# Patient Record
Sex: FEMALE | Race: WHITE | NOT HISPANIC OR LATINO | Employment: FULL TIME | URBAN - METROPOLITAN AREA
[De-identification: names, ages, dates, MRNs, and addresses within clinical notes are randomized per-mention and may not be internally consistent; named-entity substitution may affect disease eponyms.]

---

## 2023-07-20 ENCOUNTER — LAB (OUTPATIENT)
Dept: LAB | Facility: LAB | Age: 23
End: 2023-07-20
Payer: COMMERCIAL

## 2023-07-21 LAB — TOBACCO SCREEN, URINE: NEGATIVE

## 2023-09-07 ENCOUNTER — APPOINTMENT (OUTPATIENT)
Dept: PRIMARY CARE | Facility: CLINIC | Age: 23
End: 2023-09-07
Payer: COMMERCIAL

## 2023-12-11 ENCOUNTER — OFFICE VISIT (OUTPATIENT)
Dept: PRIMARY CARE | Facility: CLINIC | Age: 23
End: 2023-12-11
Payer: COMMERCIAL

## 2023-12-11 VITALS
OXYGEN SATURATION: 97 % | DIASTOLIC BLOOD PRESSURE: 64 MMHG | BODY MASS INDEX: 27.43 KG/M2 | HEIGHT: 68 IN | HEART RATE: 65 BPM | SYSTOLIC BLOOD PRESSURE: 118 MMHG | WEIGHT: 181 LBS

## 2023-12-11 DIAGNOSIS — Z76.89 ENCOUNTER TO ESTABLISH CARE: Primary | ICD-10-CM

## 2023-12-11 DIAGNOSIS — Z30.41 ENCOUNTER FOR SURVEILLANCE OF CONTRACEPTIVE PILLS: ICD-10-CM

## 2023-12-11 DIAGNOSIS — R10.13 CHRONIC EPIGASTRIC PAIN: Chronic | ICD-10-CM

## 2023-12-11 DIAGNOSIS — D17.20 LIPOMA OF UPPER EXTREMITY, UNSPECIFIED LATERALITY: ICD-10-CM

## 2023-12-11 DIAGNOSIS — R73.9 HYPERGLYCEMIA: ICD-10-CM

## 2023-12-11 DIAGNOSIS — F41.9 ANXIETY: Chronic | ICD-10-CM

## 2023-12-11 DIAGNOSIS — Z12.4 PAP SMEAR FOR CERVICAL CANCER SCREENING: ICD-10-CM

## 2023-12-11 DIAGNOSIS — G89.29 CHRONIC EPIGASTRIC PAIN: Chronic | ICD-10-CM

## 2023-12-11 DIAGNOSIS — Z13.29 THYROID DISORDER SCREENING: ICD-10-CM

## 2023-12-11 DIAGNOSIS — R10.9 STOMACHACHE: ICD-10-CM

## 2023-12-11 LAB
ALBUMIN SERPL BCP-MCNC: 4.4 G/DL (ref 3.4–5)
ALP SERPL-CCNC: 43 U/L (ref 33–110)
ALT SERPL W P-5'-P-CCNC: 24 U/L (ref 7–45)
ANION GAP SERPL CALC-SCNC: 11 MMOL/L (ref 10–20)
AST SERPL W P-5'-P-CCNC: 40 U/L (ref 9–39)
BASOPHILS # BLD AUTO: 0.02 X10*3/UL (ref 0–0.1)
BASOPHILS NFR BLD AUTO: 0.3 %
BILIRUB SERPL-MCNC: 0.3 MG/DL (ref 0–1.2)
BUN SERPL-MCNC: 13 MG/DL (ref 6–23)
CALCIUM SERPL-MCNC: 9.5 MG/DL (ref 8.6–10.6)
CHLORIDE SERPL-SCNC: 103 MMOL/L (ref 98–107)
CO2 SERPL-SCNC: 27 MMOL/L (ref 21–32)
CREAT SERPL-MCNC: 0.69 MG/DL (ref 0.5–1.05)
EOSINOPHIL # BLD AUTO: 0.16 X10*3/UL (ref 0–0.7)
EOSINOPHIL NFR BLD AUTO: 2.7 %
ERYTHROCYTE [DISTWIDTH] IN BLOOD BY AUTOMATED COUNT: 11.2 % (ref 11.5–14.5)
EST. AVERAGE GLUCOSE BLD GHB EST-MCNC: 91 MG/DL
GFR SERPL CREATININE-BSD FRML MDRD: >90 ML/MIN/1.73M*2
GLUCOSE SERPL-MCNC: 91 MG/DL (ref 74–99)
HBA1C MFR BLD: 4.8 %
HCT VFR BLD AUTO: 34.5 % (ref 36–46)
HGB BLD-MCNC: 11.9 G/DL (ref 12–16)
IMM GRANULOCYTES # BLD AUTO: 0.01 X10*3/UL (ref 0–0.7)
IMM GRANULOCYTES NFR BLD AUTO: 0.2 % (ref 0–0.9)
LYMPHOCYTES # BLD AUTO: 2.46 X10*3/UL (ref 1.2–4.8)
LYMPHOCYTES NFR BLD AUTO: 41.8 %
MCH RBC QN AUTO: 30.1 PG (ref 26–34)
MCHC RBC AUTO-ENTMCNC: 34.5 G/DL (ref 32–36)
MCV RBC AUTO: 87 FL (ref 80–100)
MONOCYTES # BLD AUTO: 0.39 X10*3/UL (ref 0.1–1)
MONOCYTES NFR BLD AUTO: 6.6 %
NEUTROPHILS # BLD AUTO: 2.85 X10*3/UL (ref 1.2–7.7)
NEUTROPHILS NFR BLD AUTO: 48.4 %
NRBC BLD-RTO: 0 /100 WBCS (ref 0–0)
PLATELET # BLD AUTO: 223 X10*3/UL (ref 150–450)
POTASSIUM SERPL-SCNC: 3.5 MMOL/L (ref 3.5–5.3)
PROT SERPL-MCNC: 7.2 G/DL (ref 6.4–8.2)
RBC # BLD AUTO: 3.96 X10*6/UL (ref 4–5.2)
SODIUM SERPL-SCNC: 137 MMOL/L (ref 136–145)
TSH SERPL-ACNC: 2.56 MIU/L (ref 0.44–3.98)
WBC # BLD AUTO: 5.9 X10*3/UL (ref 4.4–11.3)

## 2023-12-11 PROCEDURE — 36415 COLL VENOUS BLD VENIPUNCTURE: CPT

## 2023-12-11 PROCEDURE — 80053 COMPREHEN METABOLIC PANEL: CPT

## 2023-12-11 PROCEDURE — 84443 ASSAY THYROID STIM HORMONE: CPT

## 2023-12-11 PROCEDURE — 99213 OFFICE O/P EST LOW 20 MIN: CPT | Performed by: STUDENT IN AN ORGANIZED HEALTH CARE EDUCATION/TRAINING PROGRAM

## 2023-12-11 PROCEDURE — 85025 COMPLETE CBC W/AUTO DIFF WBC: CPT

## 2023-12-11 PROCEDURE — 83036 HEMOGLOBIN GLYCOSYLATED A1C: CPT

## 2023-12-11 PROCEDURE — 1036F TOBACCO NON-USER: CPT | Performed by: STUDENT IN AN ORGANIZED HEALTH CARE EDUCATION/TRAINING PROGRAM

## 2023-12-11 RX ORDER — DROSPIRENONE AND ETHINYL ESTRADIOL 0.02-3(28)
1 KIT ORAL DAILY
Qty: 28 TABLET | Refills: 11 | Status: SHIPPED | OUTPATIENT
Start: 2023-12-11 | End: 2023-12-27 | Stop reason: SDUPTHER

## 2023-12-11 RX ORDER — ESCITALOPRAM OXALATE 20 MG/1
20 TABLET ORAL
Qty: 90 TABLET | Refills: 3 | Status: SHIPPED | OUTPATIENT
Start: 2023-12-11 | End: 2023-12-27 | Stop reason: SDUPTHER

## 2023-12-11 RX ORDER — ESCITALOPRAM OXALATE 20 MG/1
20 TABLET ORAL
COMMUNITY
End: 2023-12-11 | Stop reason: SDUPTHER

## 2023-12-11 ASSESSMENT — PATIENT HEALTH QUESTIONNAIRE - PHQ9
2. FEELING DOWN, DEPRESSED OR HOPELESS: NOT AT ALL
1. LITTLE INTEREST OR PLEASURE IN DOING THINGS: NOT AT ALL
SUM OF ALL RESPONSES TO PHQ9 QUESTIONS 1 AND 2: 0

## 2023-12-11 ASSESSMENT — ENCOUNTER SYMPTOMS
BACK PAIN: 1
CONSTIPATION: 0
NEUROLOGICAL NEGATIVE: 1
ABDOMINAL PAIN: 1
CONSTITUTIONAL NEGATIVE: 1
VOMITING: 0
DIARRHEA: 0
BLOOD IN STOOL: 0
PSYCHIATRIC NEGATIVE: 1
RESPIRATORY NEGATIVE: 1
CARDIOVASCULAR NEGATIVE: 1
NAUSEA: 1

## 2023-12-11 NOTE — PROGRESS NOTES
Subjective   Patient ID: Anna Correa is a 23 y.o. female who presents for Establish Care (Stomach issues. (No weight loss & gain)).  Recently moved to the area.     Has had some stomach issues. Stomach pain, central, aching. 5/7 nights out of the week. Mild nausea, no vomiting. Last 1.5 years, worse last 6 months.     Stopped drinking milk which helped. Gets worse if she has other dairy.     No blood in stool. Increased gas.     No family hx of IBD.     Periods are irregular. Only has 2 per year. Prior transvaginal US was normal.     Has been on the lexapro for about 9 years. No side effects.     Dropped a bar on her back over the summer. Says she has intermittent back pain when she runs.     Thinks her last pap was 4 years ago.     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         Review of Systems   Constitutional: Negative.    HENT: Negative.     Respiratory: Negative.     Cardiovascular: Negative.    Gastrointestinal:  Positive for abdominal pain and nausea. Negative for blood in stool, constipation, diarrhea and vomiting.   Genitourinary:  Positive for menstrual problem.   Musculoskeletal:  Positive for back pain.   Neurological: Negative.    Psychiatric/Behavioral: Negative.     All other systems reviewed and are negative.      Objective   Physical Exam  Vitals reviewed.   Constitutional:       General: She is not in acute distress.  HENT:      Head: Normocephalic.   Eyes:      Pupils: Pupils are equal, round, and reactive to light.   Cardiovascular:      Rate and Rhythm: Normal rate and regular rhythm.   Pulmonary:      Effort: Pulmonary effort is normal. No respiratory distress.   Abdominal:      General: There is no distension.      Palpations: Abdomen is soft.      Tenderness: There is no abdominal tenderness.   Musculoskeletal:         General: Normal range of motion.   Skin:     General: Skin is warm and dry.   Neurological:      Mental Status: She is alert. Mental status is at baseline.   Psychiatric:         Mood and  Affect: Mood normal.         Behavior: Behavior normal.         Body mass index is 27.52 kg/m².      Current Outpatient Medications:     drospirenone-ethinyl estradioL (Kacey, Gianvi) 3-0.02 mg tablet, TAKE 1 TABLET DAILY., Disp: 28 tablet, Rfl: 11    escitalopram (Lexapro) 20 mg tablet, Take 1 tablet (20 mg) by mouth once daily., Disp: 90 tablet, Rfl: 3      Assessment/Plan   Diagnoses and all orders for this visit:  Encounter to establish care  Anxiety  Comments:  stable on lexapro  Orders:  -     escitalopram (Lexapro) 20 mg tablet; Take 1 tablet (20 mg) by mouth once daily.  Encounter for surveillance of contraceptive pills  -     drospirenone-ethinyl estradioL (Kacey, Gianvi) 3-0.02 mg tablet; TAKE 1 TABLET DAILY.  Stomachache  Thyroid disorder screening  -     Tsh With Reflex To Free T4 If Abnormal  Hyperglycemia  -     Hemoglobin A1c  Lipoma of upper extremity, unspecified laterality  Chronic epigastric pain  Comments:  prior normal abdominal US and labs  recommended food diary and 2 week trial PPI  GI referral if no improvement  Orders:  -     Comprehensive metabolic panel  -     CBC and Auto Differential  -     Referral to Gastroenterology; Future  Pap smear for cervical cancer screening  -     Referral to Obstetrics / Gynecology; Future       Follow up as needed    Anusha Topete DO 12/11/23 4:30 PM

## 2023-12-27 ENCOUNTER — PHARMACY VISIT (OUTPATIENT)
Dept: PHARMACY | Facility: CLINIC | Age: 23
End: 2023-12-27
Payer: COMMERCIAL

## 2023-12-27 DIAGNOSIS — F41.9 ANXIETY: Chronic | ICD-10-CM

## 2023-12-27 DIAGNOSIS — Z30.41 ENCOUNTER FOR SURVEILLANCE OF CONTRACEPTIVE PILLS: ICD-10-CM

## 2023-12-27 PROCEDURE — RXMED WILLOW AMBULATORY MEDICATION CHARGE

## 2023-12-27 RX ORDER — ESCITALOPRAM OXALATE 20 MG/1
20 TABLET ORAL
Qty: 90 TABLET | Refills: 3 | Status: SHIPPED | OUTPATIENT
Start: 2023-12-27

## 2023-12-27 RX ORDER — DROSPIRENONE AND ETHINYL ESTRADIOL 0.02-3(28)
1 KIT ORAL DAILY
Qty: 84 TABLET | Refills: 3 | Status: SHIPPED | OUTPATIENT
Start: 2023-12-27 | End: 2024-05-07 | Stop reason: SDUPTHER

## 2024-01-12 ENCOUNTER — OFFICE VISIT (OUTPATIENT)
Dept: OBSTETRICS AND GYNECOLOGY | Facility: HOSPITAL | Age: 24
End: 2024-01-12
Payer: COMMERCIAL

## 2024-01-12 VITALS
DIASTOLIC BLOOD PRESSURE: 80 MMHG | BODY MASS INDEX: 27.58 KG/M2 | WEIGHT: 182 LBS | SYSTOLIC BLOOD PRESSURE: 123 MMHG | HEIGHT: 68 IN

## 2024-01-12 DIAGNOSIS — Z12.4 SCREENING FOR MALIGNANT NEOPLASM OF CERVIX: ICD-10-CM

## 2024-01-12 DIAGNOSIS — Z01.419 WELL FEMALE EXAM WITH ROUTINE GYNECOLOGICAL EXAM: Primary | ICD-10-CM

## 2024-01-12 PROBLEM — Z00.00 ENCOUNTER FOR WELL WOMAN EXAM WITHOUT GYNECOLOGICAL EXAM: Status: ACTIVE | Noted: 2024-01-12

## 2024-01-12 PROCEDURE — 1036F TOBACCO NON-USER: CPT | Performed by: ADVANCED PRACTICE MIDWIFE

## 2024-01-12 PROCEDURE — 99385 PREV VISIT NEW AGE 18-39: CPT | Performed by: ADVANCED PRACTICE MIDWIFE

## 2024-01-12 PROCEDURE — 88175 CYTOPATH C/V AUTO FLUID REDO: CPT | Mod: TC,GCY | Performed by: ADVANCED PRACTICE MIDWIFE

## 2024-01-12 ASSESSMENT — PAIN SCALES - GENERAL: PAINLEVEL: 0-NO PAIN

## 2024-01-12 NOTE — PROGRESS NOTES
"Subjective   Anna Correa is a 23 y.o. female who is here for a routine exam.   Concerns: Patient reports an increased vaginal odor for the last couple months. Patient denies vaginal itching/burning. Declines BV/yeast swab.    Patient on OCPs. Doesn't always get her period during placebo part.     Patient's last menstrual period was 2023 (exact date).    Last pap: 21 at [facility] HPV neg  History of abnormal Pap smear: no  STI testing: no  HPV vaccination: Yes x3    Social History     Substance and Sexual Activity   Sexual Activity Yes    Partners: Male    Birth control/protection: OCP, Condom Male     OB History          0    Para   0    Term   0       0    AB   0    Living   0         SAB   0    IAB   0    Ectopic   0    Multiple   0    Live Births   0               Past Medical History:   Diagnosis Date    Anxiety disorder, unspecified     Anxiety and depression     History reviewed. No pertinent surgical history.  Family History   Problem Relation Name Age of Onset    Heart failure Maternal Grandmother      Colon cancer Maternal Grandfather  50 - 59    COPD Paternal Grandfather      Heart failure Paternal Grandfather         SoHx:  Vaginal hygiene: water   Loss of sexual desire: Yes, two months of loss of desire   Pain with sex: No  Able to orgasm: Yes   Living Situation: BF  Safe at home: Yes   School/Employment:  OT  Exercise: Yes   Substance:   Tobacco Use: Low Risk  (2024)    Patient History     Smoking Tobacco Use: Never     Smokeless Tobacco Use: Never     Passive Exposure: Not on file      Social History     Substance and Sexual Activity   Drug Use Never      Social History     Substance and Sexual Activity   Alcohol Use Yes    Comment: Social       Review of Systems   All other systems reviewed and are negative.      Objective   /80   Ht 1.727 m (5' 8\")   Wt 82.6 kg (182 lb)   LMP 2023 (Exact Date) Comment: [facility] Birth Control  BMI 27.67 kg/m²     Physical " Exam  Constitutional:       Appearance: Normal appearance.   Cardiovascular:      Rate and Rhythm: Normal rate and regular rhythm.      Heart sounds: Normal heart sounds.   Pulmonary:      Effort: Pulmonary effort is normal.      Breath sounds: Normal breath sounds.   Chest:   Breasts:     Right: Normal.      Left: Normal.   Genitourinary:     General: Normal vulva.      Vagina: Normal.      Cervix: Normal.   Skin:     General: Skin is warm and dry.   Neurological:      Mental Status: She is alert.   Psychiatric:         Mood and Affect: Mood normal.         Behavior: Behavior normal.         Thought Content: Thought content normal.         Judgment: Judgment normal.         Assessment/Plan   Problem List Items Addressed This Visit       Well female exam with routine gynecological exam - Primary    Overview     Pap collected  Declines STI testing  OCPs for birth control, reviewed with patient about how to take OCPs, what to do if any pills are forgotten, and reasons to call including [facility]  HPV x3            Plan:   -discussed about breast self awareness  -reviewed mammogram starting at 40 years old unless otherwise clinically indicated   -encouraged healthy eating, exercise recommendations based of off  min of moderate intensity exercise a week  -RTC in 1  year for next annual or prn    JACKLYN Trevizo

## 2024-01-26 LAB
CYTOLOGY CMNT CVX/VAG CYTO-IMP: NORMAL
LAB AP HPV GENOTYPE QUESTION: YES
LAB AP HPV HR: NORMAL
LABORATORY COMMENT REPORT: NORMAL
LMP START DATE: NORMAL
PATH REPORT.TOTAL CANCER: NORMAL

## 2024-02-29 NOTE — PROGRESS NOTES
Anna Correa is a 23 y.o. female with past medical history of anxiety who is referred by PCP Dr. Anusha Topete for abdominal pain. She reports a 2 year history of epigastric pain. Pain also occasionally in RUQ. Described as sharp and aching. This got significantly worse last month. Denies new medications, diets, or stressors that may have triggered this. Now occurring 5 days a week. Is aggravated about 30-60 minutes after eating dinner. She has recent nausea and bloating. Was so bad a week or 2 ago that pain was 10/10 and she vomited. No heartburn or reflux. Appetite good. 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 However abdominal MRI showed unremarkable kidneys without hydronephrosis or focal lesion. The recent ultrasound finding was artifact. Additional abdominal solid organs appeared unremarkable. She has been taking Pepcid regularly since then but it no longer seems like its helping. Bowels move 1-2 times daily, but seem to be looser when she has the pain. No rectal bleeding, hematochezia, or melena. No unintentional weight loss. She takes Advil as needed, about once a week. No prior EGD or colonoscopy. No prior surgical history.    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 Denies illicit drugs.    Family history: Maternal grandfather had colon cancer diagnosed in 50s. Mother has had gallstones.     Past Medical History:   Diagnosis Date    Anxiety disorder, unspecified     Anxiety and depression     History reviewed. No pertinent surgical history.    Current Outpatient Medications   Medication Sig Dispense Refill    drospirenone-ethinyl estradioL (Kacey, Gianvi) 3-0.02 mg tablet TAKE 1 TABLET DAILY. 84 tablet 3    escitalopram (Lexapro) 20 mg tablet Take 1 tablet (20 mg) by mouth once daily. 90 tablet 3    famotidine (Pepcid) 10 mg tablet Take by mouth.      pantoprazole (ProtoNix) 40 mg EC tablet Take 1  "tablet (40 mg) by mouth once daily in the morning. Take before meals. Do not crush, chew, or split. 30 tablet 11     No current facility-administered medications for this visit.     Allergies   Allergen Reactions    Bee Venom Protein (Honey Bee) Anaphylaxis       Family History   Problem Relation Name Age of Onset    Heart failure Maternal Grandmother      Colon cancer Maternal Grandfather  50 - 59    COPD Paternal Grandfather      Heart failure Paternal Grandfather         Review of Systems  Review of Systems negative except as noted in HPI.    Objective     /76   Pulse 70   Temp 36.4 °C (97.5 °F)   Resp 18   Ht 1.753 m (5' 9\")   Wt 83.9 kg (185 lb)   SpO2 99%   BMI 27.32 kg/m²      Physical Exam  Constitutional:  No acute distress. Normal appearance. Not ill-appearing.  HENT:  Head normocephalic and atraumatic. Conjunctivae normal.  Cardiovascular:  Normal rate. Regular rhythm.  Pulmonary:  Pulmonary effort normal. No respiratory distress. Breath sounds clear.  Abdominal:  Abdomen is flat and soft. There is no distension. Mild epigastric tenderness with palpation. No rebound or guarding.  Skin: Dry.  Neurological:  Alert and oriented.  Psychiatric:  Mood and affect normal.    Assessment/Plan     23 y.o. female with history of anxiety who presents today for initial clinic visit for 2 year history of epigastric and RUQ abdominal pain, worse the past 1-2 months now with nausea. We will repeat labs as blood work from several months ago showed a mild anemia as well as a mildly elevated AST. We will also obtain abdominal US and treat with 8 weeks PPI therapy for a possible gastritis.    Recommendations  Start Pantoprazole 40 mg daily.  Obtain labs.   Obtain abdominal ultrasound.   Follow up in 8 weeks. If no improvement, consider further evaluation with EGD.     Electronically signed by: Miranda East CNP on 3/5/2024 at 1:50 PM      "

## 2024-03-05 ENCOUNTER — PHARMACY VISIT (OUTPATIENT)
Dept: PHARMACY | Facility: CLINIC | Age: 24
End: 2024-03-05
Payer: COMMERCIAL

## 2024-03-05 ENCOUNTER — OFFICE VISIT (OUTPATIENT)
Dept: GASTROENTEROLOGY | Facility: HOSPITAL | Age: 24
End: 2024-03-05
Payer: COMMERCIAL

## 2024-03-05 VITALS
RESPIRATION RATE: 18 BRPM | BODY MASS INDEX: 27.4 KG/M2 | WEIGHT: 185 LBS | OXYGEN SATURATION: 99 % | HEART RATE: 70 BPM | HEIGHT: 69 IN | DIASTOLIC BLOOD PRESSURE: 76 MMHG | SYSTOLIC BLOOD PRESSURE: 111 MMHG | TEMPERATURE: 97.5 F

## 2024-03-05 DIAGNOSIS — R11.0 NAUSEA WITHOUT VOMITING: ICD-10-CM

## 2024-03-05 DIAGNOSIS — R74.01 ELEVATED AST (SGOT): ICD-10-CM

## 2024-03-05 DIAGNOSIS — R10.11 RUQ PAIN: ICD-10-CM

## 2024-03-05 DIAGNOSIS — R10.13 CHRONIC EPIGASTRIC PAIN: Primary | Chronic | ICD-10-CM

## 2024-03-05 DIAGNOSIS — G89.29 CHRONIC EPIGASTRIC PAIN: Primary | Chronic | ICD-10-CM

## 2024-03-05 PROCEDURE — 1036F TOBACCO NON-USER: CPT | Performed by: NURSE PRACTITIONER

## 2024-03-05 PROCEDURE — RXMED WILLOW AMBULATORY MEDICATION CHARGE

## 2024-03-05 PROCEDURE — 99214 OFFICE O/P EST MOD 30 MIN: CPT | Performed by: NURSE PRACTITIONER

## 2024-03-05 PROCEDURE — 99204 OFFICE O/P NEW MOD 45 MIN: CPT | Performed by: NURSE PRACTITIONER

## 2024-03-05 RX ORDER — OMEPRAZOLE 40 MG/1
40 CAPSULE, DELAYED RELEASE ORAL DAILY
Qty: 30 CAPSULE | Refills: 11 | Status: SHIPPED | OUTPATIENT
Start: 2024-03-05 | End: 2024-03-05 | Stop reason: SINTOL

## 2024-03-05 RX ORDER — PANTOPRAZOLE SODIUM 40 MG/1
40 TABLET, DELAYED RELEASE ORAL
Qty: 30 TABLET | Refills: 11 | Status: SHIPPED | OUTPATIENT
Start: 2024-03-05 | End: 2025-03-05

## 2024-03-05 RX ORDER — FAMOTIDINE 10 MG/1
TABLET ORAL
COMMUNITY

## 2024-03-05 SDOH — ECONOMIC STABILITY: FOOD INSECURITY: WITHIN THE PAST 12 MONTHS, THE FOOD YOU BOUGHT JUST DIDN'T LAST AND YOU DIDN'T HAVE MONEY TO GET MORE.: NEVER TRUE

## 2024-03-05 SDOH — ECONOMIC STABILITY: FOOD INSECURITY: WITHIN THE PAST 12 MONTHS, YOU WORRIED THAT YOUR FOOD WOULD RUN OUT BEFORE YOU GOT MONEY TO BUY MORE.: NEVER TRUE

## 2024-03-05 ASSESSMENT — PAIN SCALES - GENERAL: PAINLEVEL: 0-NO PAIN

## 2024-03-11 ENCOUNTER — HOSPITAL ENCOUNTER (OUTPATIENT)
Dept: RADIOLOGY | Facility: CLINIC | Age: 24
Discharge: HOME | End: 2024-03-11
Payer: COMMERCIAL

## 2024-03-11 DIAGNOSIS — R74.01 ELEVATED AST (SGOT): ICD-10-CM

## 2024-03-11 DIAGNOSIS — R10.13 CHRONIC EPIGASTRIC PAIN: Chronic | ICD-10-CM

## 2024-03-11 DIAGNOSIS — R10.11 RUQ PAIN: ICD-10-CM

## 2024-03-11 DIAGNOSIS — G89.29 CHRONIC EPIGASTRIC PAIN: Chronic | ICD-10-CM

## 2024-03-11 DIAGNOSIS — R11.0 NAUSEA WITHOUT VOMITING: ICD-10-CM

## 2024-03-11 PROCEDURE — 76700 US EXAM ABDOM COMPLETE: CPT

## 2024-03-11 PROCEDURE — 76700 US EXAM ABDOM COMPLETE: CPT | Performed by: RADIOLOGY

## 2024-03-13 PROCEDURE — RXMED WILLOW AMBULATORY MEDICATION CHARGE

## 2024-03-30 ENCOUNTER — PHARMACY VISIT (OUTPATIENT)
Dept: PHARMACY | Facility: CLINIC | Age: 24
End: 2024-03-30

## 2024-04-02 ENCOUNTER — PATIENT MESSAGE (OUTPATIENT)
Dept: PRIMARY CARE | Facility: CLINIC | Age: 24
End: 2024-04-02
Payer: COMMERCIAL

## 2024-04-02 DIAGNOSIS — R53.83 OTHER FATIGUE: Primary | ICD-10-CM

## 2024-04-02 PROCEDURE — RXMED WILLOW AMBULATORY MEDICATION CHARGE

## 2024-04-03 ENCOUNTER — LAB (OUTPATIENT)
Dept: LAB | Facility: LAB | Age: 24
End: 2024-04-03
Payer: COMMERCIAL

## 2024-04-03 DIAGNOSIS — R10.11 RUQ PAIN: ICD-10-CM

## 2024-04-03 DIAGNOSIS — R74.01 ELEVATED AST (SGOT): ICD-10-CM

## 2024-04-03 DIAGNOSIS — R11.0 NAUSEA WITHOUT VOMITING: ICD-10-CM

## 2024-04-03 DIAGNOSIS — R53.83 OTHER FATIGUE: ICD-10-CM

## 2024-04-03 DIAGNOSIS — R10.13 CHRONIC EPIGASTRIC PAIN: Chronic | ICD-10-CM

## 2024-04-03 DIAGNOSIS — G89.29 CHRONIC EPIGASTRIC PAIN: Chronic | ICD-10-CM

## 2024-04-03 LAB
ALBUMIN SERPL BCP-MCNC: 4.2 G/DL (ref 3.4–5)
ALP SERPL-CCNC: 42 U/L (ref 33–110)
ALT SERPL W P-5'-P-CCNC: 13 U/L (ref 7–45)
ANION GAP SERPL CALC-SCNC: 16 MMOL/L (ref 10–20)
AST SERPL W P-5'-P-CCNC: 14 U/L (ref 9–39)
BASOPHILS # BLD AUTO: 0.03 X10*3/UL (ref 0–0.1)
BASOPHILS NFR BLD AUTO: 0.6 %
BILIRUB SERPL-MCNC: 0.3 MG/DL (ref 0–1.2)
BUN SERPL-MCNC: 20 MG/DL (ref 6–23)
CALCIUM SERPL-MCNC: 9.1 MG/DL (ref 8.6–10.6)
CHLORIDE SERPL-SCNC: 104 MMOL/L (ref 98–107)
CO2 SERPL-SCNC: 20 MMOL/L (ref 21–32)
CREAT SERPL-MCNC: 0.7 MG/DL (ref 0.5–1.05)
EGFRCR SERPLBLD CKD-EPI 2021: >90 ML/MIN/1.73M*2
EOSINOPHIL # BLD AUTO: 0.13 X10*3/UL (ref 0–0.7)
EOSINOPHIL NFR BLD AUTO: 2.5 %
ERYTHROCYTE [DISTWIDTH] IN BLOOD BY AUTOMATED COUNT: 11.4 % (ref 11.5–14.5)
FERRITIN SERPL-MCNC: 55 NG/ML (ref 8–150)
GLUCOSE SERPL-MCNC: 107 MG/DL (ref 74–99)
HCT VFR BLD AUTO: 39.1 % (ref 36–46)
HGB BLD-MCNC: 13.2 G/DL (ref 12–16)
IMM GRANULOCYTES # BLD AUTO: 0.01 X10*3/UL (ref 0–0.7)
IMM GRANULOCYTES NFR BLD AUTO: 0.2 % (ref 0–0.9)
IRON SATN MFR SERPL: 24 % (ref 25–45)
IRON SERPL-MCNC: 100 UG/DL (ref 35–150)
LYMPHOCYTES # BLD AUTO: 2.29 X10*3/UL (ref 1.2–4.8)
LYMPHOCYTES NFR BLD AUTO: 44.9 %
MCH RBC QN AUTO: 29.9 PG (ref 26–34)
MCHC RBC AUTO-ENTMCNC: 33.8 G/DL (ref 32–36)
MCV RBC AUTO: 89 FL (ref 80–100)
MONOCYTES # BLD AUTO: 0.29 X10*3/UL (ref 0.1–1)
MONOCYTES NFR BLD AUTO: 5.7 %
NEUTROPHILS # BLD AUTO: 2.35 X10*3/UL (ref 1.2–7.7)
NEUTROPHILS NFR BLD AUTO: 46.1 %
NRBC BLD-RTO: 0 /100 WBCS (ref 0–0)
PLATELET # BLD AUTO: 252 X10*3/UL (ref 150–450)
POTASSIUM SERPL-SCNC: 4.3 MMOL/L (ref 3.5–5.3)
PROT SERPL-MCNC: 7.1 G/DL (ref 6.4–8.2)
RBC # BLD AUTO: 4.41 X10*6/UL (ref 4–5.2)
SODIUM SERPL-SCNC: 136 MMOL/L (ref 136–145)
TIBC SERPL-MCNC: 415 UG/DL (ref 240–445)
TSH SERPL-ACNC: 2.67 MIU/L (ref 0.44–3.98)
UIBC SERPL-MCNC: 315 UG/DL (ref 110–370)
WBC # BLD AUTO: 5.1 X10*3/UL (ref 4.4–11.3)

## 2024-04-03 PROCEDURE — 83550 IRON BINDING TEST: CPT

## 2024-04-03 PROCEDURE — 80053 COMPREHEN METABOLIC PANEL: CPT

## 2024-04-03 PROCEDURE — 85025 COMPLETE CBC W/AUTO DIFF WBC: CPT

## 2024-04-03 PROCEDURE — 82728 ASSAY OF FERRITIN: CPT

## 2024-04-03 PROCEDURE — 84443 ASSAY THYROID STIM HORMONE: CPT

## 2024-04-03 PROCEDURE — 36415 COLL VENOUS BLD VENIPUNCTURE: CPT

## 2024-04-03 PROCEDURE — 83540 ASSAY OF IRON: CPT

## 2024-04-05 ENCOUNTER — PHARMACY VISIT (OUTPATIENT)
Dept: PHARMACY | Facility: CLINIC | Age: 24
End: 2024-04-05
Payer: COMMERCIAL

## 2024-04-16 ENCOUNTER — PHARMACY VISIT (OUTPATIENT)
Dept: PHARMACY | Facility: CLINIC | Age: 24
End: 2024-04-16
Payer: COMMERCIAL

## 2024-04-16 NOTE — PATIENT INSTRUCTIONS
Recommendations  Start Omeprazole 1 cap in AM on empty stomach.  Obtain labs.   Obtain abdominal ultrasound. You can call Radiology/Imaging at [phone] to schedule.   Follow up in 8 weeks. Please call the office at [phone] with any questions or concerns.   
Patient

## 2024-05-07 ENCOUNTER — OFFICE VISIT (OUTPATIENT)
Dept: PRIMARY CARE | Facility: CLINIC | Age: 24
End: 2024-05-07
Payer: COMMERCIAL

## 2024-05-07 VITALS
HEART RATE: 72 BPM | OXYGEN SATURATION: 98 % | HEIGHT: 69 IN | WEIGHT: 186 LBS | DIASTOLIC BLOOD PRESSURE: 80 MMHG | SYSTOLIC BLOOD PRESSURE: 112 MMHG | BODY MASS INDEX: 27.55 KG/M2

## 2024-05-07 DIAGNOSIS — D17.20 LIPOMA OF UPPER EXTREMITY, UNSPECIFIED LATERALITY: ICD-10-CM

## 2024-05-07 DIAGNOSIS — R68.82 LOW LIBIDO: ICD-10-CM

## 2024-05-07 DIAGNOSIS — Z30.41 ENCOUNTER FOR SURVEILLANCE OF CONTRACEPTIVE PILLS: ICD-10-CM

## 2024-05-07 DIAGNOSIS — F41.9 ANXIETY: Primary | ICD-10-CM

## 2024-05-07 DIAGNOSIS — G89.29 CHRONIC EPIGASTRIC PAIN: ICD-10-CM

## 2024-05-07 DIAGNOSIS — R10.13 CHRONIC EPIGASTRIC PAIN: ICD-10-CM

## 2024-05-07 PROCEDURE — 1036F TOBACCO NON-USER: CPT | Performed by: STUDENT IN AN ORGANIZED HEALTH CARE EDUCATION/TRAINING PROGRAM

## 2024-05-07 PROCEDURE — RXMED WILLOW AMBULATORY MEDICATION CHARGE

## 2024-05-07 PROCEDURE — 99214 OFFICE O/P EST MOD 30 MIN: CPT | Performed by: STUDENT IN AN ORGANIZED HEALTH CARE EDUCATION/TRAINING PROGRAM

## 2024-05-07 RX ORDER — ESCITALOPRAM OXALATE 5 MG/1
5 TABLET ORAL DAILY
Qty: 30 TABLET | Refills: 0 | Status: SHIPPED | OUTPATIENT
Start: 2024-05-07 | End: 2024-08-05

## 2024-05-07 RX ORDER — DROSPIRENONE AND ETHINYL ESTRADIOL 0.02-3(28)
1 KIT ORAL DAILY
Qty: 84 TABLET | Refills: 3 | Status: SHIPPED | OUTPATIENT
Start: 2024-05-07 | End: 2025-05-07

## 2024-05-07 ASSESSMENT — ENCOUNTER SYMPTOMS
RESPIRATORY NEGATIVE: 1
MUSCULOSKELETAL NEGATIVE: 1
ABDOMINAL PAIN: 1
CARDIOVASCULAR NEGATIVE: 1
FATIGUE: 1
NEUROLOGICAL NEGATIVE: 1
NERVOUS/ANXIOUS: 0

## 2024-05-07 NOTE — PROGRESS NOTES
Subjective   Patient ID: Anna Correa is a 23 y.o. female who presents for Follow-up (Low energy , low sex drive /Has been on lexapro for 8 years - looking for an alternative if that is what is causing this).  Low sex drive 5 months.     Switched birth control 2 years ago. No specific reason. Thinks in future she may want to dc birth control completely. Has been on for approx 10 years total. Originally started for contraception.     Has been doing counseling, . Anxiety and depression, doing better. Has been on lexapro for 8 years. Interested in weaning down/off.     Getting more frequent lipomas. 2 new ones, one in each arm. Not painful. No family members have these that she is aware of.     Abdominal discomfort doing better on pantoprazole. Seeing GI.     No other concerns today.         Review of Systems   Constitutional:  Positive for fatigue.   HENT: Negative.     Respiratory: Negative.     Cardiovascular: Negative.    Gastrointestinal:  Positive for abdominal pain.   Genitourinary:  Negative for menstrual problem.   Musculoskeletal: Negative.    Neurological: Negative.    Psychiatric/Behavioral:  The patient is not nervous/anxious.    All other systems reviewed and are negative.      Objective   Physical Exam  Vitals reviewed.   Constitutional:       General: She is not in acute distress.  HENT:      Head: Normocephalic.      Right Ear: External ear normal.      Left Ear: External ear normal.      Nose: Nose normal.   Eyes:      Extraocular Movements: Extraocular movements intact.      Pupils: Pupils are equal, round, and reactive to light.   Cardiovascular:      Rate and Rhythm: Normal rate and regular rhythm.      Heart sounds: Normal heart sounds.   Pulmonary:      Effort: Pulmonary effort is normal.      Breath sounds: Normal breath sounds.   Abdominal:      Palpations: Abdomen is soft.      Tenderness: There is no abdominal tenderness. There is no guarding.   Musculoskeletal:         General: Normal  range of motion.      Cervical back: Normal range of motion and neck supple.   Skin:     General: Skin is warm and dry.      Comments: Left posterior forearm with small nontender subcutaneous nodule  Right upper arm with small nontender subcutaneous nodule   Neurological:      Mental Status: She is alert. Mental status is at baseline.   Psychiatric:         Mood and Affect: Mood normal.         Behavior: Behavior normal.         Body mass index is 27.47 kg/m².      Current Outpatient Medications:     escitalopram (Lexapro) 20 mg tablet, Take 1 tablet (20 mg) by mouth once daily., Disp: 90 tablet, Rfl: 3    famotidine (Pepcid) 10 mg tablet, Take by mouth., Disp: , Rfl:     pantoprazole (ProtoNix) 40 mg EC tablet, Take 1 tablet (40 mg) by mouth once daily in the morning. Take before meals. Do not crush, chew, or split., Disp: 30 tablet, Rfl: 11    drospirenone-ethinyl estradioL (Kacey, Gianvi) 3-0.02 mg tablet, TAKE 1 TABLET DAILY., Disp: 84 tablet, Rfl: 3    escitalopram (Lexapro) 5 mg tablet, Take 1 tablet (5 mg) by mouth once daily., Disp: 30 tablet, Rfl: 0      Assessment/Plan   Diagnoses and all orders for this visit:  Anxiety  Comments:  doing better  sees counselor  wants to wean off lexapro  try trintellix if anxiety recurs  Orders:  -     escitalopram (Lexapro) 5 mg tablet; Take 1 tablet (5 mg) by mouth once daily.  Encounter for surveillance of contraceptive pills  -     drospirenone-ethinyl estradioL (Kacey, Gianvi) 3-0.02 mg tablet; TAKE 1 TABLET DAILY.  Lipoma of upper extremity, unspecified laterality  Comments:  not painful  US if bothering her or changing  Chronic epigastric pain  Comments:  doing better on pantoprazole  following with GI  Low libido  Comments:  wean down on lexapro  also considering going off birth control in future    Follow up as needed       Anusha Topete DO 05/07/24 5:06 PM

## 2024-05-08 ENCOUNTER — PHARMACY VISIT (OUTPATIENT)
Dept: PHARMACY | Facility: CLINIC | Age: 24
End: 2024-05-08
Payer: COMMERCIAL

## 2024-05-08 PROCEDURE — RXMED WILLOW AMBULATORY MEDICATION CHARGE

## 2024-07-30 PROCEDURE — RXMED WILLOW AMBULATORY MEDICATION CHARGE

## 2024-07-31 ENCOUNTER — PHARMACY VISIT (OUTPATIENT)
Dept: PHARMACY | Facility: CLINIC | Age: 24
End: 2024-07-31
Payer: COMMERCIAL

## 2024-10-30 ENCOUNTER — PHARMACY VISIT (OUTPATIENT)
Dept: PHARMACY | Facility: CLINIC | Age: 24
End: 2024-10-30
Payer: COMMERCIAL

## 2024-10-30 PROCEDURE — RXMED WILLOW AMBULATORY MEDICATION CHARGE

## 2025-01-28 DIAGNOSIS — F41.9 ANXIETY: Chronic | ICD-10-CM

## 2025-01-29 PROCEDURE — RXMED WILLOW AMBULATORY MEDICATION CHARGE

## 2025-01-29 RX ORDER — ESCITALOPRAM OXALATE 20 MG/1
20 TABLET ORAL
Qty: 90 TABLET | Refills: 3 | Status: SHIPPED | OUTPATIENT
Start: 2025-01-29

## 2025-02-03 ENCOUNTER — PHARMACY VISIT (OUTPATIENT)
Dept: PHARMACY | Facility: CLINIC | Age: 25
End: 2025-02-03
Payer: COMMERCIAL

## 2025-02-13 DIAGNOSIS — K21.9 GASTROESOPHAGEAL REFLUX DISEASE, UNSPECIFIED WHETHER ESOPHAGITIS PRESENT: Primary | ICD-10-CM

## 2025-02-13 RX ORDER — SUCRALFATE 1 G/10ML
1 SUSPENSION ORAL
Qty: 1200 ML | Refills: 11 | Status: SHIPPED | OUTPATIENT
Start: 2025-02-13 | End: 2025-02-14

## 2025-02-14 ENCOUNTER — PHARMACY VISIT (OUTPATIENT)
Dept: PHARMACY | Facility: CLINIC | Age: 25
End: 2025-02-14
Payer: COMMERCIAL

## 2025-02-14 DIAGNOSIS — R10.13 CHRONIC EPIGASTRIC PAIN: Primary | ICD-10-CM

## 2025-02-14 DIAGNOSIS — R11.0 NAUSEA WITHOUT VOMITING: ICD-10-CM

## 2025-02-14 DIAGNOSIS — G89.29 CHRONIC EPIGASTRIC PAIN: Primary | ICD-10-CM

## 2025-02-14 PROCEDURE — RXMED WILLOW AMBULATORY MEDICATION CHARGE

## 2025-02-14 RX ORDER — SUCRALFATE 1 G/1
1 TABLET ORAL 4 TIMES DAILY
Qty: 120 TABLET | Refills: 11 | Status: SHIPPED | OUTPATIENT
Start: 2025-02-14 | End: 2026-02-14

## 2025-02-18 ENCOUNTER — OFFICE VISIT (OUTPATIENT)
Dept: GASTROENTEROLOGY | Facility: HOSPITAL | Age: 25
End: 2025-02-18
Payer: COMMERCIAL

## 2025-02-18 VITALS
TEMPERATURE: 96 F | RESPIRATION RATE: 16 BRPM | HEART RATE: 85 BPM | SYSTOLIC BLOOD PRESSURE: 121 MMHG | BODY MASS INDEX: 29.53 KG/M2 | OXYGEN SATURATION: 96 % | WEIGHT: 200 LBS | DIASTOLIC BLOOD PRESSURE: 83 MMHG

## 2025-02-18 DIAGNOSIS — K21.9 GASTROESOPHAGEAL REFLUX DISEASE, UNSPECIFIED WHETHER ESOPHAGITIS PRESENT: Primary | ICD-10-CM

## 2025-02-18 DIAGNOSIS — R10.9 ABDOMINAL PAIN, UNSPECIFIED ABDOMINAL LOCATION: ICD-10-CM

## 2025-02-18 DIAGNOSIS — R63.5 WEIGHT GAIN: ICD-10-CM

## 2025-02-18 DIAGNOSIS — R14.0 BLOATING: ICD-10-CM

## 2025-02-18 PROCEDURE — 99214 OFFICE O/P EST MOD 30 MIN: CPT | Performed by: NURSE PRACTITIONER

## 2025-02-18 ASSESSMENT — PAIN SCALES - GENERAL: PAINLEVEL_OUTOF10: 0-NO PAIN

## 2025-02-18 NOTE — PROGRESS NOTES
Anna Correa is a 24 y.o. female with past medical history of anxiety who presents today for follow up of abdominal pain. Initially seen 3/2024 for 2 year history of epigastric pain, sharp and aching. Now worse, occurring 5 days a week, about 30-60 minutes after eating dinner. Associated with nausea, bloating, one episode of vomiting. NSAIDS about once a week. No prior EGD or colonoscopy. No prior surgical history.    No anemia. LFTS normal. US unremarkable. Took pantoprazole for 8 weeks with improvement.    Presents today for follow up. Has been off PPI. Last month had return of daily epigastric pain about an hour after eating. Worse at night before bed. Nausea, lots of bloating, belching. Back on pantoprazole x 1 month and still occurring daily. Carafate tabs help some (liquid not covered by insurance). Thinks dairy makes it worse. Gaining weight despite working out.     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 Denies illicit drugs.     Family history: Maternal grandfather had colon cancer diagnosed in 50s. Mother has had gallstones.       Current Outpatient Medications   Medication Sig Dispense Refill    escitalopram (Lexapro) 20 mg tablet Take 1 tablet (20 mg) by mouth once daily. 90 tablet 3    famotidine (Pepcid) 10 mg tablet Take by mouth.      pantoprazole (ProtoNix) 40 mg EC tablet Take 1 tablet (40 mg) by mouth once daily in the morning. Take before meals. Do not crush, chew, or split. 30 tablet 11    sucralfate (Carafate) 1 gram tablet Take 1 tablet (1 g) by mouth 4 times a day. Take 1 hour before meals and at bedtime 120 tablet 11    drospirenone-ethinyl estradiol (Kacey) 3-0.02 mg tablet TAKE 1 TABLET DAILY. (Patient not taking: Reported on 2/18/2025) 84 tablet 3    escitalopram (Lexapro) 5 mg tablet Take 1 tablet (5 mg) by mouth once daily. 30 tablet 0     No current facility-administered medications for this visit.       Review of Systems  Review of Systems negative  except as noted in HPI.    Objective     /83 (BP Location: Right arm, Patient Position: Sitting, BP Cuff Size: Adult)   Pulse 85   Temp 35.6 °C (96 °F) (Temporal)   Resp 16   Wt 90.7 kg (200 lb)   SpO2 96%   BMI 29.53 kg/m²      Physical Exam  Constitutional:  No acute distress. Normal appearance. Not ill-appearing.  HENT:  Head normocephalic and atraumatic. Conjunctivae normal.  Cardiovascular:  Normal rate. Regular rhythm.  Pulmonary:  Pulmonary effort normal. No respiratory distress. Breath sounds clear.  Abdominal:  Abdomen is soft. There is no distension. No tenderness or guarding.  Skin: Dry.  Neurological:  Alert and oriented.  Psychiatric:  Mood and affect normal.    Assessment/Plan     24 y.o. female with history of anxiety who presents today for clinic visit for 3 year history of epigastric pain with nausea. US unremarkable. Suspect GERD as she initially had improvement with PPI therapy. Symptoms have now returned and she has restarted pantoprazole. She has not had much improvement thus far. We will repeat labs. If she continues to have sx may need EGD to further evaluate.     Recommendations  Trial Lactaid.  Continue Pantoprazole 40 mg daily, can increase to twice daily if needed.  Continue Carafate as needed.  Obtain labs.   Send me a message in 2 weeks. If no improvement need further evaluation with EGD.     Electronically signed by: Miranda East CNP on 2/18/2025 at 10:18 AM

## 2025-02-20 ENCOUNTER — PHARMACY VISIT (OUTPATIENT)
Dept: PHARMACY | Facility: CLINIC | Age: 25
End: 2025-02-20
Payer: COMMERCIAL

## 2025-02-20 PROCEDURE — RXOTC WILLOW AMBULATORY OTC CHARGE

## 2025-02-26 PROCEDURE — RXMED WILLOW AMBULATORY MEDICATION CHARGE

## 2025-03-04 ENCOUNTER — PHARMACY VISIT (OUTPATIENT)
Dept: PHARMACY | Facility: CLINIC | Age: 25
End: 2025-03-04
Payer: COMMERCIAL

## 2025-03-24 ENCOUNTER — APPOINTMENT (OUTPATIENT)
Dept: PRIMARY CARE | Facility: CLINIC | Age: 25
End: 2025-03-24
Payer: COMMERCIAL

## 2025-03-24 VITALS
BODY MASS INDEX: 29.47 KG/M2 | HEART RATE: 78 BPM | OXYGEN SATURATION: 96 % | SYSTOLIC BLOOD PRESSURE: 108 MMHG | HEIGHT: 69 IN | DIASTOLIC BLOOD PRESSURE: 70 MMHG | WEIGHT: 199 LBS

## 2025-03-24 DIAGNOSIS — Z00.00 ANNUAL PHYSICAL EXAM: ICD-10-CM

## 2025-03-24 DIAGNOSIS — F41.9 ANXIETY: Primary | ICD-10-CM

## 2025-03-24 DIAGNOSIS — R63.5 WEIGHT GAIN: ICD-10-CM

## 2025-03-24 PROCEDURE — 3008F BODY MASS INDEX DOCD: CPT | Performed by: STUDENT IN AN ORGANIZED HEALTH CARE EDUCATION/TRAINING PROGRAM

## 2025-03-24 PROCEDURE — 99395 PREV VISIT EST AGE 18-39: CPT | Performed by: STUDENT IN AN ORGANIZED HEALTH CARE EDUCATION/TRAINING PROGRAM

## 2025-03-24 PROCEDURE — 1036F TOBACCO NON-USER: CPT | Performed by: STUDENT IN AN ORGANIZED HEALTH CARE EDUCATION/TRAINING PROGRAM

## 2025-03-24 ASSESSMENT — PATIENT HEALTH QUESTIONNAIRE - PHQ9
SUM OF ALL RESPONSES TO PHQ9 QUESTIONS 1 AND 2: 0
1. LITTLE INTEREST OR PLEASURE IN DOING THINGS: NOT AT ALL
2. FEELING DOWN, DEPRESSED OR HOPELESS: NOT AT ALL

## 2025-03-24 NOTE — PROGRESS NOTES
Subjective   Patient ID: Anna Correa is a 24 y.o. female who presents for Annual Exam (Physical /Ear aches more often that usual /Trouble with weight gain - Has been training for marathon, running etc so is frustrated with the weight she is putting on ).  History of Present Illness  Anna Correa is a 24 year old female who presents with earaches and concerns about weight gain.    She has been experiencing severe earaches recently, with pain equally affecting both ears. Initially, the earaches were associated with dizziness, which has since subsided. The pain is exacerbated by cold weather and activities such as running outside or scratching her ear.    She has been struggling with weight gain over the past year despite being physically active, including training for a marathon and running approximately forty miles a week. Her weight has increased from the mid-160s to nearly 200 pounds over the past eighteen months. She has been on birth control for ten years, with a change in type two years ago, and has recently started weaning off it. She has also been taking half the dose of Lexapro, which she had previously used to manage anxiety. Was concerned this was contributing to her weight gain.     Her diet includes a high protein intake with meals such as protein breakfast burritos, [place] cake waffles, salads, and chicken. She prioritizes protein in her meals and occasionally has dessert. She reports getting about eight hours of sleep per night but has been waking up more frequently over the past three months. No significant stress or nasal congestion, but she has been feeling lightheaded at times. No cold and clammy sensations, and she is often the warmest person in the room.    She has been tracking her menstrual cycles since stopping birth control and reports having two periods since then. She denies symptoms of polycystic ovarian syndrome such as facial hair or acne. There is no known family history of thyroid  "problems.    She has a history of gastrointestinal issues and has been on pantoprazole since February, which has improved her bowel movements and reduced stomach pain.    Review of Systems  ROS otherwise negative aside from what was mentioned above in HPI.    Objective     /70 (BP Location: Right arm, Patient Position: Sitting, BP Cuff Size: Adult)   Pulse 78   Ht 1.753 m (5' 9\")   Wt 90.3 kg (199 lb)   SpO2 96%   BMI 29.39 kg/m²        Current Outpatient Medications:     escitalopram (Lexapro) 20 mg tablet, Take 1 tablet (20 mg) by mouth once daily., Disp: 90 tablet, Rfl: 3    famotidine (Pepcid) 10 mg tablet, Take by mouth., Disp: , Rfl:     pantoprazole (ProtoNix) 40 mg EC tablet, Take 1 tablet (40 mg) by mouth once daily in the morning. Take before meals. Do not crush, chew, or split., Disp: 30 tablet, Rfl: 11    sucralfate (Carafate) 1 gram tablet, Take 1 tablet (1 g) by mouth 4 times a day. Take 1 hour before meals and at bedtime, Disp: 120 tablet, Rfl: 11    drospirenone-ethinyl estradiol (Kacey) 3-0.02 mg tablet, TAKE 1 TABLET DAILY. (Patient not taking: Reported on 2/18/2025), Disp: 84 tablet, Rfl: 3    escitalopram (Lexapro) 5 mg tablet, Take 1 tablet (5 mg) by mouth once daily., Disp: 30 tablet, Rfl: 0    Physical Exam  GENERAL: Alert, cooperative, well developed, no acute distress.  HEENT: Normocephalic, normal oropharynx, moist mucous membranes. Cerumen impaction in right ear, deep and dark. Right eardrum normal. Left eardrum bulging. Right nasal mucosa swollen.  NECK: Thyroid normal.  CHEST: Clear to auscultation bilaterally. No wheezes, rhonchi, or crackles.  CARDIOVASCULAR: Normal heart rate and rhythm. S1 and S2 normal without murmurs.  ABDOMEN: Soft, non-tender, non-distended, without organomegaly. Normal bowel sounds.  EXTREMITIES: No cyanosis or edema.  NEUROLOGICAL: Cranial nerves grossly intact. Moves all extremities without gross motor or sensory deficit.    Results  RADIOLOGY  Pelvic " ultrasound: Normal-sized ovaries, no cysts (2020)    Assessment & Plan  Ear Pain  Bilateral ear pain with severe pain upon touch, exacerbated by cold weather. Examination revealed deep, dark cerumen in the left ear and a bulging tympanic membrane on the right side, suggesting pressure. No infection noted. Possible sinus-related pressure due to nasal congestion.  - Recommend Flonase for nasal congestion to relieve sinus pressure.  - Use Debrox ear drops in the left ear to soften cerumen.  - Consider ENT referral if symptoms persist.    Weight Gain  Significant weight gain over the past 18 months despite regular exercise and a balanced diet. Possible causes include metabolic disorders, hormonal imbalances, or other underlying conditions. Differential diagnosis includes insulin resistance, thyroid dysfunction, and Cushing's syndrome.  - Order labs to check blood sugar, insulin levels, A1c, thyroid function, CRP, metabolic panel, and blood counts.  - Consider cortisol level testing with a morning sample if initial labs are inconclusive.  - Monitor weight changes after discontinuation of birth control.    Low Libido  No improvement in libido after reducing Lexapro dosage. Discontinuation of birth control may impact libido and weight.  - Monitor libido changes after discontinuation of birth control.    Chronic Epigastric Pain  Chronic epigastric pain improved with pantoprazole. Bowel movements have become more frequent, but some stomach pain persists.  - Continue pantoprazole therapy.    General Health Maintenance  She is training for a marathon and maintains a balanced diet with adequate protein intake. Sleep is generally adequate, but recent disturbances noted.  - Recommend a general multivitamin to ensure nutritional balance.        Anusha Topete, DO     This medical note was created with the assistance of artificial intelligence (AI) for documentation purposes. The content has been reviewed and confirmed by the  healthcare provider for accuracy and completeness. Patient consented to the use of audio recording and use of AI during their visit.

## 2025-03-28 ENCOUNTER — PATIENT MESSAGE (OUTPATIENT)
Dept: PRIMARY CARE | Facility: CLINIC | Age: 25
End: 2025-03-28
Payer: COMMERCIAL

## 2025-03-28 DIAGNOSIS — R63.5 WEIGHT GAIN: Primary | ICD-10-CM

## 2025-03-28 LAB
CORTIS AM PEAK SERPL-MCNC: 21.4 MCG/DL
EST. AVERAGE GLUCOSE BLD GHB EST-MCNC: 100 MG/DL
EST. AVERAGE GLUCOSE BLD GHB EST-SCNC: 5.5 MMOL/L
HBA1C MFR BLD: 5.1 % OF TOTAL HGB
INSULIN SERPL-ACNC: 39.7 UIU/ML

## 2025-03-29 LAB
ALBUMIN SERPL-MCNC: 4.3 G/DL (ref 3.6–5.1)
ALP SERPL-CCNC: 66 U/L (ref 31–125)
ALT SERPL-CCNC: 29 U/L (ref 6–29)
ANION GAP SERPL CALCULATED.4IONS-SCNC: 10 MMOL/L (CALC) (ref 7–17)
AST SERPL-CCNC: 27 U/L (ref 10–30)
BILIRUB SERPL-MCNC: 0.6 MG/DL (ref 0.2–1.2)
BUN SERPL-MCNC: 16 MG/DL (ref 7–25)
CALCIUM SERPL-MCNC: 9.1 MG/DL (ref 8.6–10.2)
CHLORIDE SERPL-SCNC: 105 MMOL/L (ref 98–110)
CO2 SERPL-SCNC: 23 MMOL/L (ref 20–32)
CREAT SERPL-MCNC: 0.76 MG/DL (ref 0.5–0.96)
CRP SERPL-MCNC: 7.4 MG/L
EGFRCR SERPLBLD CKD-EPI 2021: 111 ML/MIN/1.73M2
ERYTHROCYTE [DISTWIDTH] IN BLOOD BY AUTOMATED COUNT: 11.6 % (ref 11–15)
GLIADIN IGA SER IA-ACNC: <1 U/ML
GLIADIN IGG SER IA-ACNC: <1 U/ML
GLUCOSE SERPL-MCNC: 83 MG/DL (ref 65–99)
HCT VFR BLD AUTO: 38.9 % (ref 35–45)
HGB BLD-MCNC: 13 G/DL (ref 11.7–15.5)
IGA SERPL-MCNC: 165 MG/DL (ref 47–310)
MCH RBC QN AUTO: 30.4 PG (ref 27–33)
MCHC RBC AUTO-ENTMCNC: 33.4 G/DL (ref 32–36)
MCV RBC AUTO: 90.9 FL (ref 80–100)
PLATELET # BLD AUTO: 270 THOUSAND/UL (ref 140–400)
PMV BLD REES-ECKER: 11.1 FL (ref 7.5–12.5)
POTASSIUM SERPL-SCNC: 4 MMOL/L (ref 3.5–5.3)
PROT SERPL-MCNC: 6.9 G/DL (ref 6.1–8.1)
RBC # BLD AUTO: 4.28 MILLION/UL (ref 3.8–5.1)
SODIUM SERPL-SCNC: 138 MMOL/L (ref 135–146)
TSH SERPL-ACNC: 2.96 MIU/L
TTG IGA SER-ACNC: <1 U/ML
TTG IGG SER-ACNC: <1 U/ML
WBC # BLD AUTO: 5 THOUSAND/UL (ref 3.8–10.8)

## 2025-04-01 DIAGNOSIS — R11.0 NAUSEA WITHOUT VOMITING: ICD-10-CM

## 2025-04-01 DIAGNOSIS — G89.29 CHRONIC EPIGASTRIC PAIN: Chronic | ICD-10-CM

## 2025-04-01 DIAGNOSIS — R10.13 CHRONIC EPIGASTRIC PAIN: Chronic | ICD-10-CM

## 2025-04-02 DIAGNOSIS — R10.9 ABDOMINAL PAIN, UNSPECIFIED ABDOMINAL LOCATION: Primary | ICD-10-CM

## 2025-04-02 DIAGNOSIS — R14.0 BLOATING: ICD-10-CM

## 2025-04-02 DIAGNOSIS — R11.0 NAUSEA WITHOUT VOMITING: ICD-10-CM

## 2025-04-02 RX ORDER — PANTOPRAZOLE SODIUM 40 MG/1
40 TABLET, DELAYED RELEASE ORAL
Qty: 30 TABLET | Refills: 11 | Status: SHIPPED | OUTPATIENT
Start: 2025-04-02 | End: 2026-04-02

## 2025-04-08 DIAGNOSIS — R11.0 NAUSEA WITHOUT VOMITING: ICD-10-CM

## 2025-04-08 DIAGNOSIS — R14.0 BLOATING: ICD-10-CM

## 2025-04-08 DIAGNOSIS — R10.9 ABDOMINAL PAIN, UNSPECIFIED ABDOMINAL LOCATION: Primary | ICD-10-CM

## 2025-04-09 ENCOUNTER — OFFICE VISIT (OUTPATIENT)
Dept: URGENT CARE | Age: 25
End: 2025-04-09
Payer: COMMERCIAL

## 2025-04-09 ENCOUNTER — HOSPITAL ENCOUNTER (OUTPATIENT)
Dept: RADIOLOGY | Facility: CLINIC | Age: 25
Discharge: HOME | End: 2025-04-09
Payer: COMMERCIAL

## 2025-04-09 VITALS
SYSTOLIC BLOOD PRESSURE: 114 MMHG | OXYGEN SATURATION: 99 % | TEMPERATURE: 97.9 F | HEART RATE: 78 BPM | DIASTOLIC BLOOD PRESSURE: 75 MMHG | RESPIRATION RATE: 15 BRPM

## 2025-04-09 DIAGNOSIS — S63.601A SPRAIN OF RIGHT THUMB, UNSPECIFIED SITE OF DIGIT, INITIAL ENCOUNTER: ICD-10-CM

## 2025-04-09 DIAGNOSIS — S69.91XA INJURY OF RIGHT THUMB, INITIAL ENCOUNTER: Primary | ICD-10-CM

## 2025-04-09 DIAGNOSIS — S69.91XA INJURY OF RIGHT THUMB, INITIAL ENCOUNTER: ICD-10-CM

## 2025-04-09 PROCEDURE — 73130 X-RAY EXAM OF HAND: CPT | Mod: RIGHT SIDE | Performed by: RADIOLOGY

## 2025-04-09 PROCEDURE — 73130 X-RAY EXAM OF HAND: CPT | Mod: RT

## 2025-04-09 ASSESSMENT — ENCOUNTER SYMPTOMS: ARTHRALGIAS: 1

## 2025-04-09 NOTE — LETTER
April 12, 2025     Patient: Anna Correa   YOB: 2000   Date of Visit: 4/9/2025       To Whom It May Concern:    It is my medical opinion that Anna Correa may return to full duty immediately with no restrictions.    If you have any questions or concerns, please don't hesitate to call.         Sincerely,        Anisha Flores, APRN-CNP    CC: No Recipients

## 2025-04-09 NOTE — PROGRESS NOTES
Subjective   Patient ID: Anna Correa is a 24 y.o. female. They present today with a chief complaint of Injury (Injured right thumb playing volleyball X 1 day. [facility]).    History of Present Illness  Patient presents with concern for right thumb injury, states she was playing volleyball yesterday and hit into another player. Endorses pain in the thumb only.      Injury      Past Medical History  Allergies as of 04/09/2025 - Reviewed 04/09/2025   Allergen Reaction Noted    Bee venom protein (honey bee) Anaphylaxis 10/22/2021       (Not in a hospital admission)       Past Medical History:   Diagnosis Date    Anxiety disorder, unspecified     Anxiety and depression       History reviewed. No pertinent surgical history.     reports that she has never smoked. She has never used smokeless tobacco. She reports current alcohol use. She reports that she does not use drugs.    Review of Systems  Review of Systems   Musculoskeletal:  Positive for arthralgias.                                  Objective    Vitals:    04/09/25 1719   BP: 114/75   Pulse: 78   Resp: 15   Temp: 36.6 °C (97.9 °F)   SpO2: 99%     No LMP recorded.    Physical Exam  Vitals reviewed.   Constitutional:       Appearance: Normal appearance.   Cardiovascular:      Rate and Rhythm: Normal rate.   Pulmonary:      Effort: Pulmonary effort is normal.   Musculoskeletal:         General: Swelling and tenderness present.      Comments: TTP, eccymosis, edema at thumb MTP joint and proximal phalanx   Skin:     Findings: Bruising present.   Neurological:      Mental Status: She is alert.         Procedures    Point of Care Test & Imaging Results from this visit  No results found for this visit on 04/09/25.   Imaging  XR hand right 3+ views    Result Date: 4/9/2025  Normal radiographs right hand.   Signed by: Georgi Plata 4/9/2025 6:07 PM Dictation workstation:   [id number]     Cardiology, Vascular, and Other Imaging  No other imaging results found for the past 2  days      Diagnostic study results (if any) were reviewed by AIRAM Vieira.    Assessment/Plan   Allergies, medications, history, and pertinent labs/EKGs/Imaging reviewed by AIRAM Vieira.     Medical Decision Making  Provided thumb spica. Advised RICE therapy, offered rx naproxen, pt states will use OTC meds. Advised wearing the brace while at work and doing activities until pain improves.     At time of discharge patient was clinically well-appearing and HDS for outpatient management. The patient and/or family was educated regarding diagnosis, supportive care, OTC and Rx medications. The patient and/or family was given the opportunity to ask questions prior to discharge.  They verbalized understanding of my discussion of the plans for treatment, expected course, indications to return to  or seek further evaluation in ED, and the need for timely follow up as directed.   They were provided with a work/school excuse if requested.      Orders and Diagnoses  Diagnoses and all orders for this visit:  Injury of right thumb, initial encounter  -     XR hand right 3+ views; Future  Sprain of right thumb, unspecified site of digit, initial encounter      Medical Admin Record      Patient disposition: Home    Electronically signed by AIRAM Vieira  6:14 PM

## 2025-04-24 ENCOUNTER — APPOINTMENT (OUTPATIENT)
Dept: RADIOLOGY | Facility: HOSPITAL | Age: 25
End: 2025-04-24
Payer: COMMERCIAL

## 2025-05-22 ENCOUNTER — PHARMACY VISIT (OUTPATIENT)
Dept: PHARMACY | Facility: CLINIC | Age: 25
End: 2025-05-22
Payer: COMMERCIAL

## 2025-05-22 PROCEDURE — RXOTC WILLOW AMBULATORY OTC CHARGE

## 2025-05-26 DIAGNOSIS — F41.9 ANXIETY: ICD-10-CM

## 2025-05-27 PROCEDURE — RXMED WILLOW AMBULATORY MEDICATION CHARGE

## 2025-05-27 RX ORDER — ESCITALOPRAM OXALATE 5 MG/1
5 TABLET ORAL DAILY
Qty: 30 TABLET | Refills: 0 | Status: SHIPPED | OUTPATIENT
Start: 2025-05-27 | End: 2025-08-25

## 2025-05-29 ENCOUNTER — PHARMACY VISIT (OUTPATIENT)
Dept: PHARMACY | Facility: CLINIC | Age: 25
End: 2025-05-29
Payer: COMMERCIAL

## 2025-06-13 ENCOUNTER — APPOINTMENT (OUTPATIENT)
Dept: GASTROENTEROLOGY | Facility: HOSPITAL | Age: 25
End: 2025-06-13
Payer: COMMERCIAL